# Patient Record
Sex: MALE | Race: WHITE | NOT HISPANIC OR LATINO | Employment: OTHER | URBAN - METROPOLITAN AREA
[De-identification: names, ages, dates, MRNs, and addresses within clinical notes are randomized per-mention and may not be internally consistent; named-entity substitution may affect disease eponyms.]

---

## 2020-08-18 ENCOUNTER — APPOINTMENT (OUTPATIENT)
Dept: GENERAL RADIOLOGY | Facility: CLINIC | Age: 65
End: 2020-08-18
Attending: EMERGENCY MEDICINE
Payer: COMMERCIAL

## 2020-08-18 ENCOUNTER — HOSPITAL ENCOUNTER (EMERGENCY)
Facility: CLINIC | Age: 65
Discharge: HOME | End: 2020-08-18
Attending: EMERGENCY MEDICINE | Admitting: EMERGENCY MEDICINE
Payer: COMMERCIAL

## 2020-08-18 VITALS
RESPIRATION RATE: 16 BRPM | SYSTOLIC BLOOD PRESSURE: 170 MMHG | TEMPERATURE: 98.7 F | DIASTOLIC BLOOD PRESSURE: 106 MMHG | HEART RATE: 52 BPM | OXYGEN SATURATION: 95 %

## 2020-08-18 DIAGNOSIS — I10 HYPERTENSION, UNSPECIFIED TYPE: ICD-10-CM

## 2020-08-18 DIAGNOSIS — R07.9 CHEST PAIN, UNSPECIFIED TYPE: ICD-10-CM

## 2020-08-18 LAB
ANION GAP SERPL CALCULATED.3IONS-SCNC: 6 MMOL/L (ref 3–14)
BASOPHILS # BLD AUTO: 0 10E9/L (ref 0–0.2)
BASOPHILS NFR BLD AUTO: 0.4 %
BUN SERPL-MCNC: 25 MG/DL (ref 7–30)
CALCIUM SERPL-MCNC: 8.9 MG/DL (ref 8.5–10.1)
CHLORIDE SERPL-SCNC: 109 MMOL/L (ref 94–109)
CO2 SERPL-SCNC: 24 MMOL/L (ref 20–32)
CREAT SERPL-MCNC: 1.03 MG/DL (ref 0.66–1.25)
DIFFERENTIAL METHOD BLD: NORMAL
EOSINOPHIL # BLD AUTO: 0.1 10E9/L (ref 0–0.7)
EOSINOPHIL NFR BLD AUTO: 1.9 %
ERYTHROCYTE [DISTWIDTH] IN BLOOD BY AUTOMATED COUNT: 12.7 % (ref 10–15)
GFR SERPL CREATININE-BSD FRML MDRD: 75 ML/MIN/{1.73_M2}
GLUCOSE SERPL-MCNC: 97 MG/DL (ref 70–99)
HCT VFR BLD AUTO: 48.1 % (ref 40–53)
HGB BLD-MCNC: 15.7 G/DL (ref 13.3–17.7)
IMM GRANULOCYTES # BLD: 0 10E9/L (ref 0–0.4)
IMM GRANULOCYTES NFR BLD: 0.2 %
LYMPHOCYTES # BLD AUTO: 0.9 10E9/L (ref 0.8–5.3)
LYMPHOCYTES NFR BLD AUTO: 16.9 %
MCH RBC QN AUTO: 30.1 PG (ref 26.5–33)
MCHC RBC AUTO-ENTMCNC: 32.6 G/DL (ref 31.5–36.5)
MCV RBC AUTO: 92 FL (ref 78–100)
MONOCYTES # BLD AUTO: 0.8 10E9/L (ref 0–1.3)
MONOCYTES NFR BLD AUTO: 14.8 %
NEUTROPHILS # BLD AUTO: 3.5 10E9/L (ref 1.6–8.3)
NEUTROPHILS NFR BLD AUTO: 65.8 %
NRBC # BLD AUTO: 0 10*3/UL
NRBC BLD AUTO-RTO: 0 /100
PLATELET # BLD AUTO: 188 10E9/L (ref 150–450)
POTASSIUM SERPL-SCNC: 4.2 MMOL/L (ref 3.4–5.3)
RBC # BLD AUTO: 5.22 10E12/L (ref 4.4–5.9)
SODIUM SERPL-SCNC: 139 MMOL/L (ref 133–144)
TROPONIN I SERPL-MCNC: <0.015 UG/L (ref 0–0.04)
TROPONIN I SERPL-MCNC: <0.015 UG/L (ref 0–0.04)
WBC # BLD AUTO: 5.3 10E9/L (ref 4–11)

## 2020-08-18 PROCEDURE — 84484 ASSAY OF TROPONIN QUANT: CPT | Performed by: EMERGENCY MEDICINE

## 2020-08-18 PROCEDURE — 80048 BASIC METABOLIC PNL TOTAL CA: CPT | Performed by: EMERGENCY MEDICINE

## 2020-08-18 PROCEDURE — 99285 EMERGENCY DEPT VISIT HI MDM: CPT | Mod: 25

## 2020-08-18 PROCEDURE — 71046 X-RAY EXAM CHEST 2 VIEWS: CPT

## 2020-08-18 PROCEDURE — 25000132 ZZH RX MED GY IP 250 OP 250 PS 637: Performed by: EMERGENCY MEDICINE

## 2020-08-18 PROCEDURE — 84484 ASSAY OF TROPONIN QUANT: CPT | Mod: 91 | Performed by: EMERGENCY MEDICINE

## 2020-08-18 PROCEDURE — 85025 COMPLETE CBC W/AUTO DIFF WBC: CPT | Performed by: EMERGENCY MEDICINE

## 2020-08-18 PROCEDURE — 93005 ELECTROCARDIOGRAM TRACING: CPT

## 2020-08-18 RX ORDER — NITROGLYCERIN 0.4 MG/1
0.4 TABLET SUBLINGUAL EVERY 5 MIN PRN
Status: DISCONTINUED | OUTPATIENT
Start: 2020-08-18 | End: 2020-08-18 | Stop reason: HOSPADM

## 2020-08-18 RX ORDER — ASPIRIN 81 MG/1
324 TABLET, CHEWABLE ORAL ONCE
Status: COMPLETED | OUTPATIENT
Start: 2020-08-18 | End: 2020-08-18

## 2020-08-18 RX ORDER — LOSARTAN POTASSIUM 25 MG/1
25 TABLET ORAL ONCE
Status: COMPLETED | OUTPATIENT
Start: 2020-08-18 | End: 2020-08-18

## 2020-08-18 RX ADMIN — LOSARTAN POTASSIUM 25 MG: 25 TABLET ORAL at 20:19

## 2020-08-18 RX ADMIN — ASPIRIN 81 MG 324 MG: 81 TABLET ORAL at 17:29

## 2020-08-18 ASSESSMENT — ENCOUNTER SYMPTOMS
ABDOMINAL PAIN: 0
DIAPHORESIS: 1
NAUSEA: 1
LIGHT-HEADEDNESS: 0
VOMITING: 0
SHORTNESS OF BREATH: 0

## 2020-08-18 NOTE — ED AVS SNAPSHOT
[facility] Emergency Department  [address]  [facility] [address]  Phone:  [phone]  Fax:  [phone]                                    Daniel Shi   MRN: [id number]    Department:  [facility] Emergency Department   Date of Visit:  8/18/2020           After Visit Summary Signature Page    I have received my discharge instructions, and my questions have been answered. I have discussed any challenges I see with this plan with the nurse or doctor.    ..........................................................................................................................................  Patient/Patient Representative Signature      ..........................................................................................................................................  Patient Representative Print Name and Relationship to Patient    ..................................................               ................................................  Date                                   Time    ..........................................................................................................................................  Reviewed by Signature/Title    ...................................................              ..............................................  Date                                               Time          22EPIC Rev 08/18

## 2020-08-18 NOTE — ED TRIAGE NOTES
Patient presents to the ED reporting chest pain since 1000 this morning. Reports pain was relieved with rest but returned with some activity. Also felt diaphoretic and nauseated. States BP was elevated at home.

## 2020-08-18 NOTE — ED PROVIDER NOTES
History     Chief Complaint:  Chest Pain      The history is provided by the patient.      Daniel Shi is a 65 year old male who presents with chest pain. Per the patient, a little before 1000, about 7 hours ago, he was sitting at his desk when he developed chest pain with associated nausea and diaphoresis. The chest pain lasted about 15 minutes. He went home, took his blood pressure, and found his blood pressure was at 198/100. Over the next three hours he got as high as 202/128. About an hour ago he returned to normal at 168/100. Denies shortness of breath, vomiting, abdominal pain, lightheadedness, and feeling faint. His blood pressure is treated by his primary care provider with amlodipine. He has tried lisinopril in the past. He has not seen his primary care provider for about 1.5 years. He notes an angiogram seven years ago which was normal.    Cardiac Risk Factors   Sex: Male   Tobacco: Former Smoker  Hypertension: Positive  Diabetes: Negative  Hyperlipidemia: Positive    Left Heart Cath (7/07/13)  1.  Mild to minimal coronary artery disease.   2.  Findings most consistent with a hypertensive cardiomyopathy, EF   45-50%.     Allergies:  Percocet  Lisinopril    Medications:    Aspirin  Amlodipine Besylate  Hydrochlorothiazide  Losartan  Simvastatin  Flomax    Past Medical History:    Hyperlipidemia  Hypertension  Hyperplasia of prostate  Overweight  Recurrent low back pain  Gastric reflux  Arthritis    Past Surgical History:    Appendectomy  Vasect uni/bud-sep proc-postop tanya  Turp incl icontrol postop bleed cmpl with Laser for BPH    Family History:    Father: CAD, type II diabetes, hyperlipidemia, hypertension, COPD, cataract, glaucoma  Son: Asthma    Social History:  The patient was accompanied to the ED by his wife.  Smoking Status: Former Smoker (Quit 1975)  Smokeless Tobacco: Never Used  Alcohol Use: Positive  Drug Use: Negative  PCP: Eloy Galarza  Marital Status:      Review of Systems    Constitutional: Positive for diaphoresis.   Respiratory: Negative for shortness of breath.    Cardiovascular: Positive for chest pain.   Gastrointestinal: Positive for nausea. Negative for abdominal pain and vomiting.   Neurological: Negative for light-headedness.   All other systems reviewed and are negative.    Physical Exam     Patient Vitals for the past 24 hrs:   BP Temp Temp src Pulse Heart Rate Resp SpO2   08/18/20 2055 -- -- -- -- -- 16 --   08/18/20 2045 (!) 170/106 -- -- 52 52 -- 95 %   08/18/20 2030 (!) 156/104 -- -- 55 58 -- 94 %   08/18/20 2015 (!) 179/93 -- -- 52 53 -- 96 %   08/18/20 2000 (!) 152/86 -- -- 55 62 -- 92 %   08/18/20 1945 (!) 168/103 -- -- 53 52 -- 94 %   08/18/20 1930 (!) 162/98 -- -- 52 (!) 49 -- 95 %   08/18/20 1915 (!) 158/98 -- -- 52 60 -- 94 %   08/18/20 1900 (!) 159/117 -- -- 53 52 -- 93 %   08/18/20 1845 (!) 165/103 -- -- 53 54 -- 94 %   08/18/20 1830 (!) 158/93 -- -- 51 55 -- 94 %   08/18/20 1815 (!) 158/93 -- -- 55 54 -- 92 %   08/18/20 1800 (!) 162/93 -- -- 55 56 -- 93 %   08/18/20 1745 (!) 159/95 -- -- 55 56 -- 96 %   08/18/20 1730 (!) 166/107 -- -- 57 60 -- 93 %   08/18/20 1715 (!) 173/104 -- -- 57 57 -- 97 %   08/18/20 1700 (!) 175/108 -- -- 56 59 -- 95 %   08/18/20 1645 (!) 205/137 98.7  F (37.1  C) Oral 61 -- 18 97 %       Physical Exam  Constitutional: Vital signs reviewed as above.   Head: No external signs of trauma noted.  Eyes: Pupils are equal, round, and reactive to light.   Neck: No JVD noted  Cardiovascular: Normal rate, regular rhythm and normal heart sounds.  No murmur heard. Equal B/L peripheral pulses.  Pulmonary/Chest: Effort normal and breath sounds normal. No respiratory distress. Patient has no wheezes. Patient has no rales.   Gastrointestinal: Soft. There is no tenderness.   Musculoskeletal/Extremities: No edema noted. Normal tone.  Neurological: Patient is alert and oriented to person, place, and time.   Skin: Skin is warm and dry. There is no  diaphoresis noted.   Psychiatric: The patient appears calm.        Emergency Department Course     ECG:  ECG taken at 1649, ECG read at 1700  Normal sinus rhythm  Right bundle branch block  Abnormal ECG  When compared to EKG from 7/4/2013, new right bundle branch block  Rate 67 bpm. ND interval 202 ms. QRS duration 120 ms. QT/QTc 430/454 ms. P-R-T axes 47 -4 20.    Imaging:  Radiology findings were communicated with the patient who voiced understanding of the findings.    Chest XR,  PA & LAT  Negative chest.  Reading per radiology.    Laboratory:  Laboratory findings were communicated with the patient who voiced understanding of the findings.    CBC: WBC 5.3, HGB 15.7,   BMP: WNL (Creatinine 1.03)  Troponin (Collected 1708): <0.015  Troponin (Collected 2004): <0.015    Interventions:  1729: Aspirin, 324 mg, Oral  2019: Cozaar, 25 mg, Oral    Emergency Department Course:    1649 Nursing notes and vitals reviewed. EKG obtained in the ED, see results above.     1650 I performed an exam of the patient as documented above.     1708 IV was inserted and blood was drawn for laboratory testing, results above.    1818 Patient rechecked and updated.     2004 Blood drawn for repeat troponin testing, results above.    2040 Patient rechecked and updated.     2110 Findings and plan explained to the patient. Patient discharged home with instructions regarding supportive care, medications, and reasons to return. The importance of close follow-up was reviewed.    Impression & Plan      Medical Decision Making:  Daniel Shi is a 65 year old male who presents to the emergency department today for evaluation of chest pain.  Please see the HPI and exam for specifics.  Patient remained chest pain-free during his entire ED stay and in fact noted that the pain is only present initially and not for the rest of the day.  He has been hypertensive though that has improved spontaneously as well.    I discussed observation and stress testing  with the patient and his wife though he would prefer to be able to do this as an outpatient.  He did stay for a second troponin (which fortunately was normal) and I did place an outpatient stress test order for him.  I have given him an additional losartan dose here and encouraged him to call his primary care clinic and schedule follow-up to discuss additional antihypertensive medication changes as I think he would likely do well with dose increases.    Anticipatory guidance given to patient and wife prior to discharge.    Diagnosis:    ICD-10-CM    1. Chest pain, unspecified type  R07.9 Exercise Stress Echocardiogram   2. Hypertension, unspecified type  I10      Disposition:   Patient was discharged home.    Scribe Disclosure:  I, Simon Gatica, am serving as a scribe at 4:49 PM on 8/18/2020 to document services personally performed by Jermaine Sanchez DO based on my observations and the provider's statements to me.    [facility] EMERGENCY DEPARTMENT       Jermaine Sanchez DO  08/18/20 [id number]

## 2020-08-19 LAB — INTERPRETATION ECG - MUSE: NORMAL

## 2020-08-19 NOTE — DISCHARGE INSTRUCTIONS
Diagnosis: Chest pain, hypertension  What do you do next:   Continue your home medications unless we have specifically changed them  Follow up as indicated below  You should discuss blood pressure management with your primary care clinic.  We gave you an additional dose of your losartan tonight and a reasonable neck step might be to take 50 mg of losartan daily but please call your primary care clinic to discuss this further.  We discussed hospitalization and stress testing though you would prefer to go home.  I placed a stress test order and you should hear from the cardiology service in the next 1 to 2 days.  When do you return: If you have worsening chest pain, shortness of breath, lightheadedness, sweating, nausea/vomiting, weakness, or any other symptoms that concern you, please return to the ED for reevaluation.    Thank you for allowing us to care for you today.

## 2020-08-21 ENCOUNTER — HOSPITAL ENCOUNTER (OUTPATIENT)
Dept: CARDIOLOGY | Facility: CLINIC | Age: 65
Discharge: HOME | End: 2020-08-21
Attending: EMERGENCY MEDICINE | Admitting: EMERGENCY MEDICINE
Payer: COMMERCIAL

## 2020-08-21 DIAGNOSIS — R07.9 CHEST PAIN, UNSPECIFIED TYPE: ICD-10-CM

## 2020-08-21 PROCEDURE — 93325 DOPPLER ECHO COLOR FLOW MAPG: CPT | Mod: 26 | Performed by: INTERNAL MEDICINE

## 2020-08-21 PROCEDURE — 93350 STRESS TTE ONLY: CPT | Mod: 26 | Performed by: INTERNAL MEDICINE

## 2020-08-21 PROCEDURE — 93018 CV STRESS TEST I&R ONLY: CPT | Performed by: INTERNAL MEDICINE

## 2020-08-21 PROCEDURE — 93016 CV STRESS TEST SUPVJ ONLY: CPT | Performed by: INTERNAL MEDICINE

## 2020-08-21 PROCEDURE — 93321 DOPPLER ECHO F-UP/LMTD STD: CPT | Mod: 26 | Performed by: INTERNAL MEDICINE

## 2020-08-21 PROCEDURE — 25500064 ZZH RX 255 OP 636: Performed by: EMERGENCY MEDICINE

## 2020-08-21 PROCEDURE — 93321 DOPPLER ECHO F-UP/LMTD STD: CPT | Mod: TC

## 2020-08-21 RX ADMIN — HUMAN ALBUMIN MICROSPHERES AND PERFLUTREN 3 ML: 10; .22 INJECTION, SOLUTION INTRAVENOUS at 12:58

## 2020-08-21 NOTE — ED NOTES
Updated on results of stress echo.     Patient notes that he had a video visit with his PCP, no losartan increase at this time. His PCP wants to see him again in 2 weeks.     Jermaine Sanchez, DO  08/21/20 5629

## 2024-06-28 ENCOUNTER — HOSPITAL ENCOUNTER (OUTPATIENT)
Facility: CLINIC | Age: 69
Setting detail: OBSERVATION
Discharge: HOME | End: 2024-06-29
Attending: EMERGENCY MEDICINE | Admitting: HOSPITALIST
Payer: COMMERCIAL

## 2024-06-28 ENCOUNTER — APPOINTMENT (OUTPATIENT)
Dept: GENERAL RADIOLOGY | Facility: CLINIC | Age: 69
End: 2024-06-28
Attending: EMERGENCY MEDICINE
Payer: COMMERCIAL

## 2024-06-28 DIAGNOSIS — R07.89 OTHER CHEST PAIN: ICD-10-CM

## 2024-06-28 DIAGNOSIS — R94.31 ACUTE ELECTROCARDIOGRAM CHANGES: ICD-10-CM

## 2024-06-28 PROBLEM — R07.9 CHEST PAIN: Status: ACTIVE | Noted: 2024-06-28

## 2024-06-28 LAB
ANION GAP SERPL CALCULATED.3IONS-SCNC: 14 MMOL/L (ref 7–15)
BASOPHILS # BLD AUTO: 0 10E3/UL (ref 0–0.2)
BASOPHILS NFR BLD AUTO: 1 %
BUN SERPL-MCNC: 32.3 MG/DL (ref 8–23)
CALCIUM SERPL-MCNC: 9.4 MG/DL (ref 8.8–10.2)
CHLORIDE SERPL-SCNC: 102 MMOL/L (ref 98–107)
CREAT SERPL-MCNC: 1.15 MG/DL (ref 0.67–1.17)
DEPRECATED HCO3 PLAS-SCNC: 21 MMOL/L (ref 22–29)
EGFRCR SERPLBLD CKD-EPI 2021: 68 ML/MIN/1.73M2
EOSINOPHIL # BLD AUTO: 0.1 10E3/UL (ref 0–0.7)
EOSINOPHIL NFR BLD AUTO: 2 %
ERYTHROCYTE [DISTWIDTH] IN BLOOD BY AUTOMATED COUNT: 13.3 % (ref 10–15)
GLUCOSE SERPL-MCNC: 90 MG/DL (ref 70–99)
HCT VFR BLD AUTO: 48.2 % (ref 40–53)
HGB BLD-MCNC: 16.4 G/DL (ref 13.3–17.7)
HOLD SPECIMEN: NORMAL
HOLD SPECIMEN: NORMAL
IMM GRANULOCYTES # BLD: 0 10E3/UL
IMM GRANULOCYTES NFR BLD: 0 %
LYMPHOCYTES # BLD AUTO: 1.7 10E3/UL (ref 0.8–5.3)
LYMPHOCYTES NFR BLD AUTO: 27 %
MCH RBC QN AUTO: 29.8 PG (ref 26.5–33)
MCHC RBC AUTO-ENTMCNC: 34 G/DL (ref 31.5–36.5)
MCV RBC AUTO: 88 FL (ref 78–100)
MONOCYTES # BLD AUTO: 1 10E3/UL (ref 0–1.3)
MONOCYTES NFR BLD AUTO: 16 %
NEUTROPHILS # BLD AUTO: 3.4 10E3/UL (ref 1.6–8.3)
NEUTROPHILS NFR BLD AUTO: 54 %
NRBC # BLD AUTO: 0 10E3/UL
NRBC BLD AUTO-RTO: 0 /100
PLATELET # BLD AUTO: 204 10E3/UL (ref 150–450)
POTASSIUM SERPL-SCNC: 4 MMOL/L (ref 3.4–5.3)
RBC # BLD AUTO: 5.5 10E6/UL (ref 4.4–5.9)
SODIUM SERPL-SCNC: 137 MMOL/L (ref 135–145)
TROPONIN T SERPL HS-MCNC: 16 NG/L
TROPONIN T SERPL HS-MCNC: 16 NG/L
WBC # BLD AUTO: 6.2 10E3/UL (ref 4–11)

## 2024-06-28 PROCEDURE — 80048 BASIC METABOLIC PNL TOTAL CA: CPT | Performed by: EMERGENCY MEDICINE

## 2024-06-28 PROCEDURE — 84484 ASSAY OF TROPONIN QUANT: CPT | Performed by: EMERGENCY MEDICINE

## 2024-06-28 PROCEDURE — 85025 COMPLETE CBC W/AUTO DIFF WBC: CPT | Performed by: EMERGENCY MEDICINE

## 2024-06-28 PROCEDURE — 250N000013 HC RX MED GY IP 250 OP 250 PS 637: Performed by: EMERGENCY MEDICINE

## 2024-06-28 PROCEDURE — G0378 HOSPITAL OBSERVATION PER HR: HCPCS

## 2024-06-28 PROCEDURE — 99285 EMERGENCY DEPT VISIT HI MDM: CPT | Mod: 25

## 2024-06-28 PROCEDURE — 99222 1ST HOSP IP/OBS MODERATE 55: CPT | Performed by: HOSPITALIST

## 2024-06-28 PROCEDURE — 71046 X-RAY EXAM CHEST 2 VIEWS: CPT

## 2024-06-28 PROCEDURE — 36415 COLL VENOUS BLD VENIPUNCTURE: CPT | Performed by: EMERGENCY MEDICINE

## 2024-06-28 PROCEDURE — 93005 ELECTROCARDIOGRAM TRACING: CPT

## 2024-06-28 RX ORDER — AMLODIPINE BESYLATE 5 MG/1
10 TABLET ORAL DAILY
Status: DISCONTINUED | OUTPATIENT
Start: 2024-06-29 | End: 2024-06-29 | Stop reason: HOSPADM

## 2024-06-28 RX ORDER — ONDANSETRON 2 MG/ML
4 INJECTION INTRAMUSCULAR; INTRAVENOUS EVERY 6 HOURS PRN
Status: DISCONTINUED | OUTPATIENT
Start: 2024-06-28 | End: 2024-06-29 | Stop reason: HOSPADM

## 2024-06-28 RX ORDER — ATORVASTATIN CALCIUM 40 MG/1
40 TABLET, FILM COATED ORAL EVERY EVENING
Status: DISCONTINUED | OUTPATIENT
Start: 2024-06-28 | End: 2024-06-29 | Stop reason: HOSPADM

## 2024-06-28 RX ORDER — LOSARTAN POTASSIUM 100 MG/1
100 TABLET ORAL EVERY EVENING
Status: DISCONTINUED | OUTPATIENT
Start: 2024-06-28 | End: 2024-06-29 | Stop reason: HOSPADM

## 2024-06-28 RX ORDER — ONDANSETRON 4 MG/1
4 TABLET, ORALLY DISINTEGRATING ORAL EVERY 6 HOURS PRN
Status: DISCONTINUED | OUTPATIENT
Start: 2024-06-28 | End: 2024-06-29 | Stop reason: HOSPADM

## 2024-06-28 RX ORDER — ACETAMINOPHEN 650 MG/1
650 SUPPOSITORY RECTAL EVERY 4 HOURS PRN
Status: DISCONTINUED | OUTPATIENT
Start: 2024-06-28 | End: 2024-06-29 | Stop reason: HOSPADM

## 2024-06-28 RX ORDER — SIMVASTATIN 20 MG
20 TABLET ORAL EVERY EVENING
Status: CANCELLED | OUTPATIENT
Start: 2024-06-28

## 2024-06-28 RX ORDER — AMOXICILLIN 250 MG
2 CAPSULE ORAL 2 TIMES DAILY PRN
Status: DISCONTINUED | OUTPATIENT
Start: 2024-06-28 | End: 2024-06-29 | Stop reason: HOSPADM

## 2024-06-28 RX ORDER — ASPIRIN 325 MG
325 TABLET ORAL ONCE
Status: COMPLETED | OUTPATIENT
Start: 2024-06-28 | End: 2024-06-28

## 2024-06-28 RX ORDER — ACETAMINOPHEN 325 MG/1
650 TABLET ORAL EVERY 4 HOURS PRN
Status: DISCONTINUED | OUTPATIENT
Start: 2024-06-28 | End: 2024-06-29 | Stop reason: HOSPADM

## 2024-06-28 RX ORDER — HYDRALAZINE HYDROCHLORIDE 20 MG/ML
10 INJECTION INTRAMUSCULAR; INTRAVENOUS EVERY 4 HOURS PRN
Status: DISCONTINUED | OUTPATIENT
Start: 2024-06-28 | End: 2024-06-29 | Stop reason: HOSPADM

## 2024-06-28 RX ORDER — AMOXICILLIN 250 MG
1 CAPSULE ORAL 2 TIMES DAILY PRN
Status: DISCONTINUED | OUTPATIENT
Start: 2024-06-28 | End: 2024-06-29 | Stop reason: HOSPADM

## 2024-06-28 RX ADMIN — ASPIRIN 325 MG ORAL TABLET 325 MG: 325 PILL ORAL at 21:37

## 2024-06-28 ASSESSMENT — ACTIVITIES OF DAILY LIVING (ADL)
ADLS_ACUITY_SCORE: 35
ADLS_ACUITY_SCORE: 35
ADLS_ACUITY_SCORE: 33
ADLS_ACUITY_SCORE: 35

## 2024-06-28 ASSESSMENT — COLUMBIA-SUICIDE SEVERITY RATING SCALE - C-SSRS
2. HAVE YOU ACTUALLY HAD ANY THOUGHTS OF KILLING YOURSELF IN THE PAST MONTH?: NO
1. IN THE PAST MONTH, HAVE YOU WISHED YOU WERE DEAD OR WISHED YOU COULD GO TO SLEEP AND NOT WAKE UP?: NO
6. HAVE YOU EVER DONE ANYTHING, STARTED TO DO ANYTHING, OR PREPARED TO DO ANYTHING TO END YOUR LIFE?: NO

## 2024-06-29 ENCOUNTER — APPOINTMENT (OUTPATIENT)
Dept: CARDIOLOGY | Facility: CLINIC | Age: 69
End: 2024-06-29
Attending: HOSPITALIST
Payer: COMMERCIAL

## 2024-06-29 VITALS
HEART RATE: 69 BPM | DIASTOLIC BLOOD PRESSURE: 94 MMHG | TEMPERATURE: 97.7 F | BODY MASS INDEX: 30.27 KG/M2 | RESPIRATION RATE: 16 BRPM | SYSTOLIC BLOOD PRESSURE: 138 MMHG | HEIGHT: 69 IN | OXYGEN SATURATION: 95 % | WEIGHT: 204.37 LBS

## 2024-06-29 LAB
ANION GAP SERPL CALCULATED.3IONS-SCNC: 14 MMOL/L (ref 7–15)
ATRIAL RATE - MUSE: 65 BPM
BUN SERPL-MCNC: 30.4 MG/DL (ref 8–23)
CALCIUM SERPL-MCNC: 8.8 MG/DL (ref 8.8–10.2)
CHLORIDE SERPL-SCNC: 106 MMOL/L (ref 98–107)
CREAT SERPL-MCNC: 1.06 MG/DL (ref 0.67–1.17)
DEPRECATED HCO3 PLAS-SCNC: 21 MMOL/L (ref 22–29)
DIASTOLIC BLOOD PRESSURE - MUSE: NORMAL MMHG
EGFRCR SERPLBLD CKD-EPI 2021: 75 ML/MIN/1.73M2
ERYTHROCYTE [DISTWIDTH] IN BLOOD BY AUTOMATED COUNT: 13.3 % (ref 10–15)
GLUCOSE SERPL-MCNC: 96 MG/DL (ref 70–99)
HCT VFR BLD AUTO: 45.6 % (ref 40–53)
HGB BLD-MCNC: 15.3 G/DL (ref 13.3–17.7)
INTERPRETATION ECG - MUSE: NORMAL
MCH RBC QN AUTO: 29.5 PG (ref 26.5–33)
MCHC RBC AUTO-ENTMCNC: 33.6 G/DL (ref 31.5–36.5)
MCV RBC AUTO: 88 FL (ref 78–100)
P AXIS - MUSE: 43 DEGREES
PLATELET # BLD AUTO: 171 10E3/UL (ref 150–450)
POTASSIUM SERPL-SCNC: 3.3 MMOL/L (ref 3.4–5.3)
PR INTERVAL - MUSE: 222 MS
QRS DURATION - MUSE: 136 MS
QT - MUSE: 456 MS
QTC - MUSE: 474 MS
R AXIS - MUSE: -15 DEGREES
RBC # BLD AUTO: 5.19 10E6/UL (ref 4.4–5.9)
SODIUM SERPL-SCNC: 141 MMOL/L (ref 135–145)
SYSTOLIC BLOOD PRESSURE - MUSE: NORMAL MMHG
T AXIS - MUSE: 14 DEGREES
TROPONIN T SERPL HS-MCNC: 19 NG/L
VENTRICULAR RATE- MUSE: 65 BPM
WBC # BLD AUTO: 5.2 10E3/UL (ref 4–11)

## 2024-06-29 PROCEDURE — 93325 DOPPLER ECHO COLOR FLOW MAPG: CPT | Mod: 26 | Performed by: INTERNAL MEDICINE

## 2024-06-29 PROCEDURE — 80048 BASIC METABOLIC PNL TOTAL CA: CPT | Performed by: HOSPITALIST

## 2024-06-29 PROCEDURE — 255N000002 HC RX 255 OP 636: Performed by: HOSPITALIST

## 2024-06-29 PROCEDURE — 85027 COMPLETE CBC AUTOMATED: CPT | Performed by: HOSPITALIST

## 2024-06-29 PROCEDURE — C8928 TTE W OR W/O FOL W/CON,STRES: HCPCS

## 2024-06-29 PROCEDURE — 999N000208 ECHO STRESS ECHOCARDIOGRAM

## 2024-06-29 PROCEDURE — 93350 STRESS TTE ONLY: CPT | Mod: 26 | Performed by: INTERNAL MEDICINE

## 2024-06-29 PROCEDURE — 99239 HOSP IP/OBS DSCHRG MGMT >30: CPT | Performed by: PHYSICIAN ASSISTANT

## 2024-06-29 PROCEDURE — 93321 DOPPLER ECHO F-UP/LMTD STD: CPT | Mod: 26 | Performed by: INTERNAL MEDICINE

## 2024-06-29 PROCEDURE — 84484 ASSAY OF TROPONIN QUANT: CPT | Performed by: HOSPITALIST

## 2024-06-29 PROCEDURE — 93018 CV STRESS TEST I&R ONLY: CPT | Performed by: INTERNAL MEDICINE

## 2024-06-29 PROCEDURE — 36415 COLL VENOUS BLD VENIPUNCTURE: CPT | Performed by: HOSPITALIST

## 2024-06-29 PROCEDURE — 93016 CV STRESS TEST SUPVJ ONLY: CPT | Performed by: INTERNAL MEDICINE

## 2024-06-29 PROCEDURE — G0378 HOSPITAL OBSERVATION PER HR: HCPCS

## 2024-06-29 RX ORDER — ATORVASTATIN CALCIUM 40 MG/1
40 TABLET, FILM COATED ORAL DAILY
COMMUNITY

## 2024-06-29 RX ORDER — AMLODIPINE BESYLATE 10 MG/1
10 TABLET ORAL EVERY EVENING
COMMUNITY

## 2024-06-29 RX ORDER — LOSARTAN POTASSIUM AND HYDROCHLOROTHIAZIDE 25; 100 MG/1; MG/1
1 TABLET ORAL EVERY EVENING
COMMUNITY

## 2024-06-29 RX ADMIN — HUMAN ALBUMIN MICROSPHERES AND PERFLUTREN 3 ML: 10; .22 INJECTION, SOLUTION INTRAVENOUS at 08:26

## 2024-06-29 ASSESSMENT — ACTIVITIES OF DAILY LIVING (ADL)
ADLS_ACUITY_SCORE: 31

## 2024-06-29 NOTE — ED PROVIDER NOTES
"  Emergency Department Note      History of Present Illness     Chief Complaint   Chest Pain      HPI   Daniel Shi is a 69 year old male with a history of hypertension and hyperlipidemia who presents to the ED with his wife for evaluation of chest pain. The patient states he started experience left arm tingling and numbness along with shortness of breath and chest pain described as a \"weight on my chest\" yesterday at around 1430. He took his blood pressure while sitting at that time and found it to be 203/61. He took it 3 more times every 5 minutes with the last reading at 131/95. By this last blood pressure, all symptoms had dissipated. Reports a blood pressure of 135/90 at bedtime last night and 130/80 this morning. At about 1530 today, he had the same symptoms aside from the arm numbness or tingling that lasted about 10 minutes. Notes nothing makes his symptoms better or worse. He called the nurse triage line and was advised to be seen here. His wife states these sort of episodes have been going on for 1 to 2 weeks. Reports taking all prescription medications as recommended. Denies nausea or diaphoresis. Denies known heart conditions.    Independent Historian   Wife as detailed above.    Review of External Notes   I reviewed the urgent care note from today for chest pain.     Past Medical History     Medical History and Problem List   Nuclear cataract  BPH  Choroidal nevus  Exotropia  Fatty liver  HLD  HTN  Intracranial hemorrhage  Plantar fasciitis  Pulmonary nodule   Short-term memory loss    Medications   Amlodipine  Aspirin   Losartan  Simvastatin     Surgical History   Appendectomy     Physical Exam     Patient Vitals for the past 24 hrs:   BP Temp Temp src Pulse Resp SpO2 Height Weight   06/28/24 2134 -- -- -- 66 12 -- -- --   06/28/24 2133 -- -- -- 66 13 -- -- --   06/28/24 2132 -- -- -- 65 15 -- -- --   06/28/24 2130 (!) 148/97 -- -- 56 -- -- -- --   06/28/24 2046 -- -- -- 61 18 94 % -- --   06/28/24 " "2045 (!) 136/90 -- -- 58 19 94 % -- --   06/28/24 2031 -- -- -- 56 11 96 % -- --   06/28/24 2030 (!) 128/99 -- -- 71 -- -- -- --   06/28/24 2015 126/89 -- -- 60 21 92 % -- --   06/28/24 2003 -- -- -- 67 20 96 % -- --   06/28/24 2002 -- -- -- 70 16 94 % -- --   06/28/24 2001 -- -- -- 70 19 94 % -- --   06/28/24 2000 130/87 -- -- 68 18 92 % -- --   06/28/24 1756 (!) 140/91 98.7  F (37.1  C) Oral 89 17 96 % 1.753 m (5' 9\") 93.6 kg (206 lb 5.6 oz)     Physical Exam  General: The patient is alert, in no respiratory distress.    HENT: Mucous membranes moist.    Cardiovascular: Regular rate and rhythm. Good pulses in all four extremities. Normal capillary refill and skin turgor.     Respiratory: Lungs are clear. No nasal flaring. No retractions. No wheezing, no crackles.    Gastrointestinal: Abdomen soft. No guarding, no rebound. No palpable hernias.     Musculoskeletal: No gross deformity.     Skin: No rashes or petechiae.     Neurologic: The patient is alert and oriented x3. GCS 15. No testable cranial nerve deficit. Follows commands with clear and appropriate speech. Gives appropriate answers. Good strength in all extremities. No gross neurologic deficit. Gross sensation intact. Pupils are round and reactive. No meningismus.     Lymphatic: No cervical adenopathy. No lower extremity swelling.    Psychiatric: The patient is non-tearful.    Diagnostics     Lab Results   Labs Ordered and Resulted from Time of ED Arrival to Time of ED Departure   BASIC METABOLIC PANEL - Abnormal       Result Value    Sodium 137      Potassium 4.0      Chloride 102      Carbon Dioxide (CO2) 21 (*)     Anion Gap 14      Urea Nitrogen 32.3 (*)     Creatinine 1.15      GFR Estimate 68      Calcium 9.4      Glucose 90     TROPONIN T, HIGH SENSITIVITY - Normal    Troponin T, High Sensitivity 16     TROPONIN T, HIGH SENSITIVITY - Normal    Troponin T, High Sensitivity 16     CBC WITH PLATELETS AND DIFFERENTIAL    WBC Count 6.2      RBC Count 5.50   "    Hemoglobin 16.4      Hematocrit 48.2      MCV 88      MCH 29.8      MCHC 34.0      RDW 13.3      Platelet Count 204      % Neutrophils 54      % Lymphocytes 27      % Monocytes 16      % Eosinophils 2      % Basophils 1      % Immature Granulocytes 0      NRBCs per 100 WBC 0      Absolute Neutrophils 3.4      Absolute Lymphocytes 1.7      Absolute Monocytes 1.0      Absolute Eosinophils 0.1      Absolute Basophils 0.0      Absolute Immature Granulocytes 0.0      Absolute NRBCs 0.0         Imaging   XR Chest 2 Views   Final Result   IMPRESSION:       Lungs are clear. No evidence of pneumonia. No pleural effusions or pneumothorax. Normal pulmonary vascularity. Nonenlarged cardiac silhouette.          EKG   ECG taken at 1816, ECG read at 1930  Sinus rhythm with 1st degree AV block  Right bundle branch block   New ST deviation as compared to prior, dated 8/8/20.  Rate 65 bpm. GA interval 222 ms. QRS duration 136 ms. QT/QTc 456/474 ms. P-R-T axes 43 -15 14.    Independent Interpretation   CXR shows no pneumonia or pneumothorax. No enlarged cardium.    ED Course      Medications Administered   Medications   aspirin (ASA) tablet 325 mg (325 mg Oral $Given 6/28/24 2137)       Procedures   Procedures     Discussion of Management   Admitting Hospitalist, Dr. Morrow  I discussed the case with the patient's wife as well    Social Determinants of Health adding to complexity of care   Medical compliance    ED Course   ED Course as of 06/28/24 2217 Fri Jun 28, 2024 1950 I obtained history and performed a physical exam as noted above.    2059 I rechecked and updated the patient. I discussed admission and the risks of going home.   2131 I rechecked and updated the patient. He agreed to be admitted.       Medical Decision Making / Diagnosis     MIPS       None    [facility]   Daniel Shi is a 69 year old male who has a story very concerning for ACS and that he developed chest heaviness rating to his left arm associated associated with  shortness of breath.  The episodes were intermittent.  This could explain why the patient's troponins were negative however with his intermittent symptoms I did consider with the EKG changes there could be underlying cardiac disease I held on heparin on him he will require admission for stress echo due to the EKG changes I discussed the risk of going home.  And the patient was admitted to hospital in good condition.    Disposition   The patient was admitted to the hospital.     Diagnosis     ICD-10-CM    1. Other chest pain  R07.89       2. Acute electrocardiogram changes  R94.31              Scribe Disclosure:  I, Lindsey Taveras, am serving as a scribe at 9:05 PM on 6/28/2024 to document services personally performed by Pato Cruz MD based on my observations and the provider's statements to me.        Pato Cruz MD  06/28/24 1326

## 2024-06-29 NOTE — PHARMACY-ADMISSION MEDICATION HISTORY
Pharmacy Intern Admission Medication History    Admission medication history is complete. The information provided in this note is only as accurate as the sources available at the time of the update.    Information Source(s): Patient and CareEverywhere/SureScripts via in-person    Pertinent Information: None    Changes made to PTA medication list:  Added: Losartan-hydrochlorothiazide, Atorvastatin  Deleted: Losartan, Simvastatin  Changed: None    Allergies reviewed with patient and updates made in EHR: yes    Medication History Completed By: Lane Boone 6/29/2024 8:49 AM    PTA Med List   Medication Sig Last Dose    amLODIPine (NORVASC) 10 MG tablet Take 10 mg by mouth every evening 6/27/2024 at PM    atorvastatin (LIPITOR) 40 MG tablet Take 40 mg by mouth daily 6/27/2024 at PM    losartan-hydrochlorothiazide (HYZAAR) 100-25 MG tablet Take 1 tablet by mouth every evening 6/27/2024 at PM

## 2024-06-29 NOTE — H&P
[facility]    History and Physical  Hospitalist       Date of Admission:  6/28/2024    Assessment & Plan   Daniel Shi is a 69 year old male with a past medical history of hypertension, hyperlipidemia who presents for evaluation of chest pain.    #Chest pain: Patient describes chest pain he described as both dull and sharp that occurred while he was at rest laying down yesterday afternoon.  He checked his blood pressure and noted that it was very elevated with systolic blood pressure over 200.  He then rechecked it and it was still elevated.  He waited around 10 minutes and his blood pressure did come down to around 140/90.  He noted that his pain dissipated over about 5 minutes time.  He had some associated shortness of breath.  Some tingling in his arm but no radiation of the pain to his jaw or shoulder.  He then had recurrence of his symptoms at approximately 1530 today.  Again he checked his blood pressure with his blood pressure being very elevated.  He called the nurse line and was told to come to the ER.  No nausea or vomiting.  No diaphoresis.  He notes that he does generally have to stop if he walks long distances due to fatigue.  He does mow his lawn and does not get chest pain.  -Patient last had a stress echocardiogram in August 2020 that was normal and without any evidence of stress-induced ischemia.  He had a cardiac cath done in July 2013 that showed mild/minimal CAD.  He had findings consistent with hypertensive cardiomyopathy.  -ER, patient afebrile and nontachycardic.  Mildly hypertensive with systolic blood pressure in the 130-150 range.  CBC unremarkable.  BMP unremarkable.  High-sensitivity troponin detectable but not elevated at 16 and stable on recheck.  Patient with sinus rhythm with a right bundle branch block with some diffuse, mild ST segment depression.  It does look like he had an EKG done through [facility] with right bundle branch block in April 2023.   Patient was given full dose aspirin.  Patient denies any current chest pain.  -We will registered observation.  Monitor on telemetry.  Obtain stress echocardiogram.  Check 1 more troponin.  Cardiac and no caffeine diet.     #HTN: Patient is currently on amlodipine 10 mg, losartan/hydrochlorothiazide 100 mg / 25 mg.  He takes his medications at night.  He had very high blood pressures at home.  His blood pressures here in the ER mildly elevated but not severe.  We will monitor his blood pressures here and he may need modification/additional medication management if persistently high.  -Continue losartan 100 mg daily.  Continue amlodipine 10 mg daily.  As needed hydralazine available.    #HL: Continue atorvastatin 40 mg daily  #Obesity: Complicates cares    DVT Prophylaxis: Pneumatic Compression Devices  Code Status: Full Code  Dispo: [place] to observation    Jarad Morrow MD    Primary Care Physician   Florentino Carmona MD    Chief Complaint   Chest pain    History is obtained from the patient, patient's chart and discussed with ER physician    History of Present Illness   Daniel Shi is a 69 year old male with a past medical history of hypertension, hyperlipidemia who presents for evaluation of chest pain.    Patient describes chest pain he described as both dull and sharp that occurred while he was at rest laying down yesterday afternoon.  He checked his blood pressure and noted that it was very elevated with systolic blood pressure over 200.  He then rechecked it and it was still elevated.  He waited around 10 minutes and his blood pressure did come down to around 140/90.  He noted that his pain dissipated over about 5 minutes time.  He had some associated shortness of breath.  Some tingling in his arm but no radiation of the pain to his jaw or shoulder.  He then had recurrence of his symptoms at approximately 1530 today.  Again he checked his blood pressure with his blood pressure being very elevated.  He called  the nurse line and was told to come to the ER.  No nausea or vomiting.  No diaphoresis.  He notes that he does generally have to stop if he walks long distances due to fatigue.  He does mow his lawn and does not get chest pain.    Patient last had a stress echocardiogram in August 2020 that was normal and without any evidence of stress-induced ischemia.  He had a cardiac cath done in July 2013 that showed mild/minimal CAD.  He had findings consistent with hypertensive cardiomyopathy.    In the ER, patient afebrile and nontachycardic.  Mildly hypertensive with systolic blood pressure in the 130-150 range.  CBC unremarkable.  BMP unremarkable.  High-sensitivity troponin detectable but not elevated at 16 and stable on recheck.  Patient with sinus rhythm with a right bundle branch block with some diffuse, mild ST segment depression.  It does look like he had an EKG done through [facility] with right bundle branch block in April 2023.  Patient was given full dose aspirin.  Patient denies any current chest pain.    Past Medical History    I have reviewed this patient's medical history and updated it with pertinent information if needed.   Past Medical History:   Diagnosis Date    Hyperlipidaemia     Hypertension        Past Surgical History   I have reviewed this patient's surgical history and updated it with pertinent information if needed.  Past Surgical History:   Procedure Laterality Date    APPENDECTOMY         Prior to Admission Medications   Prior to Admission Medications   Prescriptions Last Dose Informant Patient Reported? Taking?   AMLODIPINE BESYLATE PO  Pharmacy Yes No   Sig: Take 10 mg by mouth every evening.   LOSARTAN POTASSIUM PO  Pharmacy Yes No   Sig: Take 25 mg by mouth every evening.   SIMVASTATIN PO  Pharmacy Yes No   Sig: Take 20 mg by mouth every evening.      Facility-Administered Medications: None     Allergies   Allergies   Allergen Reactions    Lisinopril      Chronic cough    Percocet  [Oxycodone-Acetaminophen] Itching     Patient relates itching with narcotics       Social History   I have reviewed this patient's social history and updated it with pertinent information if needed. Daniel Shi  reports that he has quit smoking. He does not have any smokeless tobacco history on file. He reports current alcohol use. He reports that he does not use drugs.    Family History   I have reviewed this patient's family history and updated it with pertinent information if needed.   Father with heart disease    Review of Systems   The 10 point Review of Systems is negative other than noted in the HPI or here.     Physical Exam   Temp: 98.7  F (37.1  C) Temp src: Oral BP: (!) 148/97 Pulse: 66   Resp: 12 SpO2: 94 % O2 Device: None (Room air)    Vital Signs with Ranges  Temp:  [98.7  F (37.1  C)] 98.7  F (37.1  C)  Pulse:  [56-89] 66  Resp:  [11-21] 12  BP: (126-148)/(87-99) 148/97  SpO2:  [92 %-96 %] 94 %  206 lbs 5.61 oz    Constitutional: NAD, Nontoxic.  Calm and cooperative.  HEENT: Normocephalic, MMM, no elevation of JVD noted  Respiratory: Nl WOB, Clear bilaterally, No wheezes, no crackles  Cardiovascular: Regular, no murmur  GI: Obese, BS+, NT, ND, no rebound or guarding  Lymph/Hematologic: No bruising. No cervical LAD  Skin: No rash  Musculoskeletal: Nl Tone, No edema noted  Neurologic: A&Ox3, Answers appropriately. CNII-XII intact. Moves all extremities. No tremor  Psychiatric: Calm    Data   Data reviewed today:  I personally reviewed   Recent Labs   Lab 06/28/24  1829   WBC 6.2   HGB 16.4   MCV 88         POTASSIUM 4.0   CHLORIDE 102   CO2 21*   BUN 32.3*   CR 1.15   ANIONGAP 14   CHUCK 9.4   GLC 90       Recent Results (from the past 24 hour(s))   XR Chest 2 Views    Narrative    EXAM: XR CHEST 2 VIEWS  LOCATION: [facility]  DATE: 6/28/2024    INDICATION: Chest pain.   COMPARISON: Chest radiograph 8/18/2020.       Impression    IMPRESSION:     Lungs are clear. No  "evidence of pneumonia. No pleural effusions or pneumothorax. Normal pulmonary vascularity. Nonenlarged cardiac silhouette.       Clinically Significant Risk Factors Present on Admission                  # Hypertension: Home medication list includes antihypertensive(s)             # Obesity: Estimated body mass index is 30.47 kg/m  as calculated from the following:    Height as of this encounter: 1.753 m (5' 9\").    Weight as of this encounter: 93.6 kg (206 lb 5.6 oz).                 "

## 2024-06-29 NOTE — ED NOTES
[facility]  ED Nurse Handoff Report    ED Chief complaint: Chest Pain  . ED Diagnosis:   Final diagnoses:   None       Allergies:   Allergies   Allergen Reactions    Lisinopril      Chronic cough    Percocet [Oxycodone-Acetaminophen] Itching     Patient relates itching with narcotics       Code Status: Full Code    Activity level - Baseline/Home:  independent.  Activity Level - Current:   independent.   Lift room needed: No.   Bariatric: No   Needed: No   Isolation: No.   Infection: Not Applicable.     Respiratory status: Room air    Vital Signs (within 30 minutes):   Vitals:    06/28/24 2030 06/28/24 2031 06/28/24 2045 06/28/24 2046   BP: (!) 128/99  (!) 136/90    Pulse: 71 56 58 61   Resp:  11 19 18   Temp:       TempSrc:       SpO2:  96% 94% 94%   Weight:       Height:           Cardiac Rhythm:  ,      Pain level:    Patient confused: No.   Patient Falls Risk: patient and family education.   Elimination Status:  hasn't voided yet      Patient Report - Initial Complaint: . Pt comes in with chest pain that started yesterday. He states that he thought it was heart burn but when he laid down he noticed pain and tingling down his left arm. He took his blood pressure and states that it was very high. He was seen in UC and sent to the ED for abnormal EKG findings.   Focused Assessment: chest pain    Abnormal Results:   Labs Ordered and Resulted from Time of ED Arrival to Time of ED Departure   BASIC METABOLIC PANEL - Abnormal       Result Value    Sodium 137      Potassium 4.0      Chloride 102      Carbon Dioxide (CO2) 21 (*)     Anion Gap 14      Urea Nitrogen 32.3 (*)     Creatinine 1.15      GFR Estimate 68      Calcium 9.4      Glucose 90     TROPONIN T, HIGH SENSITIVITY - Normal    Troponin T, High Sensitivity 16     CBC WITH PLATELETS AND DIFFERENTIAL    WBC Count 6.2      RBC Count 5.50      Hemoglobin 16.4      Hematocrit 48.2      MCV 88      MCH 29.8      MCHC 34.0      RDW  13.3      Platelet Count 204      % Neutrophils 54      % Lymphocytes 27      % Monocytes 16      % Eosinophils 2      % Basophils 1      % Immature Granulocytes 0      NRBCs per 100 WBC 0      Absolute Neutrophils 3.4      Absolute Lymphocytes 1.7      Absolute Monocytes 1.0      Absolute Eosinophils 0.1      Absolute Basophils 0.0      Absolute Immature Granulocytes 0.0      Absolute NRBCs 0.0     TROPONIN T, HIGH SENSITIVITY        XR Chest 2 Views   Final Result   IMPRESSION:       Lungs are clear. No evidence of pneumonia. No pleural effusions or pneumothorax. Normal pulmonary vascularity. Nonenlarged cardiac silhouette.          Treatments provided: see MAR  Family Comments: wife at bedside  OBS brochure/video discussed/provided to patient:  Yes  ED Medications: Medications - No data to display    Drips infusing:  No  For the majority of the shift this patient was Green.   Interventions performed were .    Sepsis treatment initiated: No    Cares/treatment/interventions/medications to be completed following ED care: all admit orders    ED Nurse Name: Areli Nathan RN  9:32 PM   RECEIVING UNIT ED HANDOFF REVIEW    Above ED Nurse Handoff Report was reviewed: Yes  Reviewed by: Osito Roca RN on June 28, 2024 at 11:24 PM   RAYMOND Shipley called the ED to inform them the note was read: Yes

## 2024-06-29 NOTE — PLAN OF CARE
ROOM # 204-1    Living Situation (if not independent, order SW consult):  Facility name: From home w/spouse  : Margoth Shi (Spouse)  [phone]    Activity level at baseline: Independent  Activity level on admit: Independent    Who will be transporting you at discharge: Margoth Shi (Spouse)    Patient registered to observation; given Patient Bill of Rights; given the opportunity to ask questions about observation status and their plan of care.  Patient has been oriented to the observation room, bathroom and call light is in place.    Discussed discharge goals and expectations with patient/family.         Goal Outcome Evaluation:

## 2024-06-29 NOTE — PLAN OF CARE
"Goal Outcome Evaluation:      Plan of Care Reviewed With: patient    Overall Patient Progress: no changeOverall Patient Progress: no change    Outcome Evaluation: A&OX4, denies pain, indepndent w/activities, on tele SR 60s. Plan is Echo tomorrow.      Problem: Adult Inpatient Plan of Care  Goal: Plan of Care Review  Description: T  Outcome: Progressing  Flowsheets (Taken 6/29/2024 0117)  Outcome Evaluation: A&OX4, denies pain, indepndent w/activities, on tele SR 60s. Plan is Echo tomorrow.  Plan of Care Reviewed With: patient  Overall Patient Progress: no change  Goal: Patient-Specific Goal (Individualized)  Description: You can add care plan individualizations to a care plan. Examples of Individualization might be:  \"Parent requests to be called daily at 9am for status\", \"I have a hard time hearing out of my right ear\", or \"Do not touch me to wake me up as it startles  me\".  Outcome: Progressing  Goal: Absence of Hospital-Acquired Illness or Injury  Outcome: Progressing  Intervention: Prevent Skin Injury  Recent Flowsheet Documentation  Taken 6/28/2024 2350 by Osito Roca, RN  Body Position: position changed independently  Goal: Optimal Comfort and Wellbeing  Outcome: Progressing  Goal: Readiness for Transition of Care  Outcome: Progressing     Problem: Chest Pain  Goal: Resolution of Chest Pain Symptoms  Outcome: Progressing     "

## 2024-06-29 NOTE — DISCHARGE SUMMARY
"[facility]  Hospitalist Discharge Summary      Date of Admission:  6/28/2024  Date of Discharge:  6/29/2024  Discharging Provider: Марина Chatman PA-C  Discharge Service: Hospitalist Service    Discharge Diagnoses   Atypical chest pain  Transient hypertension    Clinically Significant Risk Factors     # Obesity: Estimated body mass index is 30.18 kg/m  as calculated from the following:    Height as of this encounter: 1.753 m (5' 9\").    Weight as of this encounter: 92.7 kg (204 lb 5.9 oz).       Follow-ups Needed After Discharge   Follow-up Appointments     Follow-up and recommended labs and tests       Follow-up with your primary care provider in a couple weeks to review   your blood pressure diary to see if medicines need to be adjusted.    Follow up on ascending aorta dilation.            Unresulted Labs Ordered in the Past 30 Days of this Admission       No orders found for last 31 day(s).            Discharge Disposition   Discharged to home  Condition at discharge: Stable    Hospital Course   Daniel Shi is a 69 year old male with a past medical history of hypertension, hyperlipidemia who presents for evaluation of chest pain.     Patient describes chest pain he described as both dull and sharp that occurred while he was at rest laying down yesterday afternoon.  He checked his blood pressure and noted that it was very elevated with systolic blood pressure over 200.  He then rechecked it and it was still elevated.  He waited around 10 minutes and his blood pressure did come down to around 140/90.  He noted that his pain dissipated over about 5 minutes time.  He had some associated shortness of breath.  Some tingling in his arm but no radiation of the pain to his jaw or shoulder.  He then had recurrence of his symptoms at approximately 1530 today.  Again he checked his blood pressure with his blood pressure being very elevated.  He called the nurse line and was told to come to the " ER.  No nausea or vomiting.  No diaphoresis.  He notes that he does generally have to stop if he walks long distances due to fatigue.  He does mow his lawn and does not get chest pain.     Patient last had a stress echocardiogram in August 2020 that was normal and without any evidence of stress-induced ischemia.  He had a cardiac cath done in July 2013 that showed mild/minimal CAD.  He had findings consistent with hypertensive cardiomyopathy.     In the ER, patient afebrile and nontachycardic.  Mildly hypertensive with systolic blood pressure in the 130-150 range.  CBC unremarkable.  BMP unremarkable.  High-sensitivity troponin detectable but not elevated at 16 and stable on recheck.  Patient with sinus rhythm with a right bundle branch block with some diffuse, mild ST segment depression.  It does look like he had an EKG done through [facility] with right bundle branch block in April 2023.  Patient was given full dose aspirin.  Patient denies any current chest pain and was admitted under observation.    Overnight patient had no further episodes of chest discomfort.  Telemetry monitoring was normal.  He had a stress echocardiogram done without any chest pain while ambulating.  EKG portion had very minimal ST depression and echocardiogram portion did not show any wall motion abnormality.  It was considered a normal stress test.    On further questioning patient reported that when he does have the chest discomfort if he presses on his chest wall it feels better.  Suspect there may be some component of musculoskeletal pain.  Also his blood pressure was elevated during these episodes which may be contributing.  He did not have significantly elevated blood pressure here so adjustments were not made but I did tell him to check his blood pressure every morning and keep diary of this.    Consultations This Hospital Stay   None    Code Status   Full Code    Time Spent on this Encounter   Марина ANDRADE PA-C,  personally saw the patient today and spent greater than 30 minutes discharging this patient.       Марина Chatman PA-C  [facility] OBSERVATION DEPT  [address]  [facility] [address]  Phone: [phone]  ______________________________________________________________________    Physical Exam   Vital Signs: Temp: 98  F (36.7  C) Temp src: Oral BP: 131/84 Pulse: 68   Resp: 16 SpO2: 96 % O2 Device: None (Room air)    Weight: 204 lbs 5.86 oz  General Appearance: Alert and orientated x 3  Respiratory: CTAB  Cardiovascular: RRR without murmur  GI: Bowel sounds are present without tenderness  Skin: No rash or open sores         Primary Care Physician   Florentino Carmona MD    Discharge Orders      Reason for your hospital stay    You were admitted for concerns of chest discomfort.  We monitored your troponins which did not show evidence of heart attack and we did a stress test which did not show any evidence of heart disease.  Incidentally it did show that you have a small dilation of your ascending aorta which will need to be followed up by your primary care provider in the future.    Possibly her chest pain was related to high blood pressure but at this point we do not want to change her blood pressure medicines.  We recommend checking your blood pressure every morning before you take your medicines and keeping a diary of this.  Based on your story we suspect that your chest pain was musculoskeletal since it felt better with putting pressure on it.  For this we recommend Tylenol, heat or ice.    Should your chest discomfort change and become more significant with exertion or you become more short of breath with it please come back to the emergency room.     Follow-up and recommended labs and tests     Follow-up with your primary care provider in a couple weeks to review your blood pressure diary to see if medicines need to be adjusted.    Follow up on ascending aorta dilation.      Activity    Your activity upon discharge: activity as tolerated     Monitor and record    blood pressure daily     Diet    Follow this diet upon discharge: Regular       Significant Results and Procedures   Most Recent 3 CBC's:  Recent Labs   Lab Test 24  0615 24  1829 20  1708   WBC 5.2 6.2 5.3   HGB 15.3 16.4 15.7   MCV 88 88 92    204 188     Most Recent 3 BMP's:  Recent Labs   Lab Test 24  0615 24  18220  1708    137 139   POTASSIUM 3.3* 4.0 4.2   CHLORIDE 106 102 109   CO2 21* 21* 24   BUN 30.4* 32.3* 25   CR 1.06 1.15 1.03   ANIONGAP 14 14 6   CHUCK 8.8 9.4 8.9   GLC 96 90 97     Most Recent 3 Troponin's:  Recent Labs   Lab Test 20  1708   TROPI <0.015 <0.015   ,   Results for orders placed or performed during the hospital encounter of 24   XR Chest 2 Views    Narrative    EXAM: XR CHEST 2 VIEWS  LOCATION: [facility]  DATE: 2024    INDICATION: Chest pain.   COMPARISON: Chest radiograph 2020.       Impression    IMPRESSION:     Lungs are clear. No evidence of pneumonia. No pleural effusions or pneumothorax. Normal pulmonary vascularity. Nonenlarged cardiac silhouette.   Echo Stress Echocardiogram    Narrative    [id number]  [id number]  [id number]  [id number]^SHONDA^JUAN C     [facility]  Echocardiography Laboratory  [address] [place] [address]     Name: KELLY BASSETT  MRN: [id number]  : 1955  Study Date: 2024 07:25 AM  Age: 69 yrs  Gender: Male  Patient Location: [facility]  Reason For Study: Chest Pain  History: Chest Pain  Ordering Physician: JUAN C MERHCANT  Referring Physician: Florentino Carmona  Performed By: Elise Rothman RDCS     BSA: 2.1 m2  Height: 69 in  Weight: 204 lb  HR: 71  BP: 124/84 mmHg  ______________________________________________________________________________  Procedure  Stress Echo Complete. Contrast  [employer].  ______________________________________________________________________________  Interpretation Summary  This was a normal stress echocardiogram with no evidence of stress-induced  ischemia. 4.1cm ascending aorta.  ______________________________________________________________________________  Stress  The patient exercised 6:53.  RPP [id number].  This study was stopped as the patient achieved an adequate exercise effort for  a diagnostic study.  The patient exhibited no chest pain during exercise.  There was a normal BP response to exercise.  Exercise was stopped due to fatigue.  Target Heart Rate was achieved.  The Duke treadmill score was low risk ( >5 Duke score).  The EKG portion of this stress test was negative for inducible ischemia (see  echo results below).  Occasional premature ventricular contractions.  There was a maximum 1.0mm ST segment depression in the anterior lead(s).  Normal resting wall motion and no stress-induced wall motion abnormality.  Left ventricular cavity size decreases with exercise.  Global LV systolic function augments with exercise.     Baseline  1' AVB and RBBB.  Normal left ventricular function and wall motion at rest.  The visual ejection fraction is estimated at 55-60%.  No hemodynamically significant valvular abnormalities on 2D or color flow  imaging.     Stress Results             Protocol:  Robert          Maximum Predicted HR:   149 bpm             Target HR: 127 bpm        % Maximum Predicted HR: 87 %            Stage  DurationHeart Rate  BP                Comment                 (mm:ss)   (bpm)        Stage 1   3:00      103   140/78        Stage 2   3:00      120   148/76        Stage 3   0:53      130   150/78Duke Treadmill Score: 7 (Low Risk)       RecoveryR  5:00      93    130/74FAC: Average            Stress Duration:   6:53 mm:ss *        Recovery Time: 5:00 mm:ss         Maximum Stress HR: 130 bpm *           METS:          8     Aortic Valve  The aortic valve is  not well visualized.     Vessels  Aortic root dilatation is present. Ascending aorta dilatation is present.  ______________________________________________________________________________  MMode/2D Measurements & Calculations     Ao root diam: 4.1 cm  asc Aorta Diam: 4.1 cm  Ao root diam index Ht(cm/m): 2.3  Ao root diam index BSA (cm/m2): 2.0  Asc Ao diam index BSA (cm/m2): 2.0  Asc Ao diam index Ht(cm/m): 2.3     Doppler Measurements & Calculations  Ao V2 max: 141.2 cm/sec  Ao max P.0 mmHg  PA acc time: 0.16 sec  PI end-d magno: 88.2 cm/sec     ______________________________________________________________________________  Report approved by: Garrick Frias 2024 08:28 AM             Discharge Medications   Current Discharge Medication List        CONTINUE these medications which have NOT CHANGED    Details   amLODIPine (NORVASC) 10 MG tablet Take 10 mg by mouth every evening      atorvastatin (LIPITOR) 40 MG tablet Take 40 mg by mouth daily      losartan-hydrochlorothiazide (HYZAAR) 100-25 MG tablet Take 1 tablet by mouth every evening           STOP taking these medications       LOSARTAN POTASSIUM PO Comments:   Reason for Stopping:             Allergies   Allergies   Allergen Reactions    Lisinopril      Chronic cough    Percocet [Oxycodone-Acetaminophen] Itching     Patient relates itching with narcotics

## 2024-06-29 NOTE — PLAN OF CARE
"PRIMARY DIAGNOSIS: CHEST PAIN  OUTPATIENT/OBSERVATION GOALS TO BE MET BEFORE DISCHARGE:  1. Ruled out acute coronary syndrome (negative or stable Troponin):  Yes  2. Pain Status: Pain free.  3. Appropriate provocative testing performed: Yes  - Stress Test Procedure: Echo  - Interpretation of cardiac rhythm per telemetry tech: SB 56    4. Cleared by Consultants (if applicable):No  5. Return to near baseline physical activity: Yes  Discharge Planner Nurse   Safe discharge environment identified: No  Barriers to discharge: No, stress test done this am.         Entered by: Evelia Newman RN 06/29/2024 9:27 AM     Please review provider order for any additional goals.   Nurse to notify provider when observation goals have been met and patient is ready for discharge.  Goal Outcome Evaluation:      Plan of Care Reviewed With: patient    Overall Patient Progress: improvingOverall Patient Progress: improving    Outcome Evaluation: vss afebrile. denies chest pain. pt up  indep in room. pt has stress test this am.      Problem: Adult Inpatient Plan of Care  Goal: Plan of Care Review  Description: The Plan of Care Review/Shift note should be completed every shift.  The Outcome Evaluation is a brief statement about your assessment that the patient is improving, declining, or no change.  This information will be displayed automatically on your shift  note.  Outcome: Progressing  Flowsheets (Taken 6/29/2024 0950)  Outcome Evaluation: vss afebrile. denies chest pain. pt up  indep in room. pt has stress test this am.  Plan of Care Reviewed With: patient  Overall Patient Progress: improving  Goal: Patient-Specific Goal (Individualized)  Description: You can add care plan individualizations to a care plan. Examples of Individualization might be:  \"Parent requests to be called daily at 9am for status\", \"I have a hard time hearing out of my right ear\", or \"Do not touch me to wake me up as it startles  me\".  Outcome: " Progressing  Goal: Absence of Hospital-Acquired Illness or Injury  Outcome: Progressing  Intervention: Identify and Manage Fall Risk  Recent Flowsheet Documentation  Taken 6/29/2024 0730 by Evelia Cramer RN  Safety Promotion/Fall Prevention:   clutter free environment maintained   room organization consistent  Intervention: Prevent Skin Injury  Recent Flowsheet Documentation  Taken 6/29/2024 0730 by Evelia Cramer RN  Body Position: position changed independently  Intervention: Prevent Infection  Recent Flowsheet Documentation  Taken 6/29/2024 0730 by Evelia Cramer RN  Infection Prevention: rest/sleep promoted  Goal: Optimal Comfort and Wellbeing  Outcome: Progressing  Goal: Readiness for Transition of Care  Outcome: Progressing     Problem: Chest Pain  Goal: Resolution of Chest Pain Symptoms  Outcome: Progressing

## 2024-06-29 NOTE — PLAN OF CARE
"  Problem: Adult Inpatient Plan of Care  Goal: Plan of Care Review  Description: The Plan of Care Review/Shift note should be completed every shift.  The Outcome Evaluation is a brief statement about your assessment that the patient is improving, declining, or no change.  This information will be displayed automatically on your shift  note.  6/29/2024 1147 by Evelia Cramer RN  Outcome: Met  Flowsheets (Taken 6/29/2024 1115)  Outcome Evaluation: pt d/c to home.  Plan of Care Reviewed With: patient  Overall Patient Progress: improving  6/29/2024 0925 by Evelia Cramer RN  Outcome: Progressing  Flowsheets (Taken 6/29/2024 0925)  Outcome Evaluation: vss afebrile. denies chest pain. pt up  indep in room. pt has stress test this am.  Plan of Care Reviewed With: patient  Overall Patient Progress: improving  Goal: Patient-Specific Goal (Individualized)  Description: You can add care plan individualizations to a care plan. Examples of Individualization might be:  \"Parent requests to be called daily at 9am for status\", \"I have a hard time hearing out of my right ear\", or \"Do not touch me to wake me up as it startles  me\".  6/29/2024 1147 by Evelia Cramer RN  Outcome: Met  6/29/2024 0925 by Evelia Cramer RN  Outcome: Progressing  Goal: Absence of Hospital-Acquired Illness or Injury  6/29/2024 1147 by Evelia Cramer RN  Outcome: Met  6/29/2024 0925 by Evelia Cramer RN  Outcome: Progressing  Intervention: Identify and Manage Fall Risk  Recent Flowsheet Documentation  Taken 6/29/2024 0730 by Evelia Cramer RN  Safety Promotion/Fall Prevention:   clutter free environment maintained   room organization consistent  Intervention: Prevent Skin Injury  Recent Flowsheet Documentation  Taken 6/29/2024 0730 by Evelia Cramer RN  Body Position: position changed independently  Intervention: Prevent Infection  Recent Flowsheet Documentation  Taken 6/29/2024 0730 by " Evelia Cramer RN  Infection Prevention: rest/sleep promoted  Goal: Optimal Comfort and Wellbeing  6/29/2024 1147 by Evelia Cramer RN  Outcome: Met  6/29/2024 0925 by Evelia Cramer RN  Outcome: Progressing  Goal: Readiness for Transition of Care  6/29/2024 1147 by Evelia Cramer RN  Outcome: Met  6/29/2024 0925 by Evelia Cramer RN  Outcome: Progressing     Problem: Chest Pain  Goal: Resolution of Chest Pain Symptoms  6/29/2024 1147 by Evelia Cramer RN  Outcome: Met  6/29/2024 0925 by Evelia Cramer RN  Outcome: Progressing       Goal Outcome Evaluation:      Plan of Care Reviewed With: patient    Overall Patient Progress: improvingOverall Patient Progress: improving    Outcome Evaluation: pt d/c to home.

## 2025-03-25 NOTE — ED TRIAGE NOTES
Pt comes in with chest pain that started yesterday.  He states that he thought it was heart burn but when he laid down he noticed pain and tingling down his left arm.  He took his blood pressure and states that it was very high.  He was seen in  and sent to the ED for abnormal EKG findings.       Triage Assessment (Adult)       Row Name 06/28/24 6935          Triage Assessment    Airway WDL WDL        Respiratory WDL    Respiratory WDL WDL        Cardiac WDL    Cardiac WDL X;chest pain                      Regardin F [place] continual weight loss, 117 last OV, now 110 7 lbs since January, feeling great otherwise  ----- Message from Viridiana CHILD sent at 3/24/2025  5:27 PM CDT -----  Patient Name: Sarah White    Specialist or PCP Name:  RADHA DINERO     Symptoms: 86 F [place] continual weight loss, 117 last OV, now 110 7 lbs since January, feel fine otherwise feeling great    Pregnant (females aged 13-60. If Yes, how long?) : n/a    Call Back # : [phone]    Which State are you currently located in?: [place]    Name of Clinic Site / Acct# : [facility]    Call arrived during: Work Hours